# Patient Record
Sex: FEMALE | Race: OTHER | NOT HISPANIC OR LATINO | URBAN - METROPOLITAN AREA
[De-identification: names, ages, dates, MRNs, and addresses within clinical notes are randomized per-mention and may not be internally consistent; named-entity substitution may affect disease eponyms.]

---
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
## 2023-05-15 NOTE — ED PROVIDER NOTE - CADM POA PRESS ULCER
I have reviewed discharge instructions with the patient. The patient verbalized understanding. Patient left IR via Discharge Method: ambulatory to Home with self    Opportunity for questions and clarification provided. Patient given 0 scripts. To continue your aftercare when you leave the hospital, you may receive an automated call from our care team to check in on how you are doing. This is a free service and part of our promise to provide the best care and service to meet your aftercare needs.  If you have questions, or wish to unsubscribe from this service please call [phone].
Interventional Radiology Post Paracentesis/Thoracentesis Note    5/15/2023    Procedure(s): Ultrasound guided Therapeutic Paracentesis Performed with 8 Jordanian catheter total volume 9250 ml. Preliminary Findings: moderate cloudy. Complications: None    Plan:  Observation, check labs if drawn.           Chest X-Ray:  no    Full dictated report to follow
No

## 2023-10-02 NOTE — DISCHARGE NOTE ADULT - PATIENT PORTAL LINK FT
You can access the [facility] Portal, offered by [facility], by registering with the following website: [email or web address]
- - -

## 2025-06-26 NOTE — H&P ADULT - PROBLEM SELECTOR PLAN 4
Nicotinamide Supplementation Recommendations: Discussed with patient the use of nicotinamide 250 mg one pill twice daily Sunscreen Recommendations: Discussed continual use of sun protection with sun screen with SPF 30 and photo protective clothing. Detail Level: Simple Detail Level: Zone Sunscreen Recommendations: Recommended tinted mineral based sun screen. Discussed importance of reapplication. Skin Checks Recommendations: Check skin on a monthly basis for changes and to become familiar with moles. IMPROVE VTE Individual Risk Assessment    RISK                                                          Points  [] Previous VTE                                           3  [] Thrombophilia                                        2  [] Lower limb paralysis                              2   [] Current Cancer                                       2   [] Immobilization > 24 hrs                        1  [] ICU/CCU stay > 24 hours                       1  [] Age > 60                                                   1    IMPROVE VTE Score: 0, no indication for DVT PPx